# Patient Record
Sex: FEMALE | Employment: FULL TIME | URBAN - METROPOLITAN AREA
[De-identification: names, ages, dates, MRNs, and addresses within clinical notes are randomized per-mention and may not be internally consistent; named-entity substitution may affect disease eponyms.]

---

## 2019-06-13 ENCOUNTER — OFFICE VISIT (OUTPATIENT)
Dept: OTOLARYNGOLOGY | Facility: CLINIC | Age: 69
End: 2019-06-13
Payer: COMMERCIAL

## 2019-06-13 ENCOUNTER — OFFICE VISIT (OUTPATIENT)
Dept: AUDIOLOGY | Facility: CLINIC | Age: 69
End: 2019-06-13
Payer: COMMERCIAL

## 2019-06-13 VITALS
HEIGHT: 60 IN | SYSTOLIC BLOOD PRESSURE: 115 MMHG | DIASTOLIC BLOOD PRESSURE: 78 MMHG | WEIGHT: 98 LBS | OXYGEN SATURATION: 100 % | BODY MASS INDEX: 19.24 KG/M2 | HEART RATE: 65 BPM

## 2019-06-13 DIAGNOSIS — H90.8 MIXED HEARING LOSS, UNILATERAL: ICD-10-CM

## 2019-06-13 DIAGNOSIS — H90.A31 MIXED CONDUCTIVE AND SENSORINEURAL HEARING LOSS OF RIGHT EAR WITH RESTRICTED HEARING OF LEFT EAR: Primary | ICD-10-CM

## 2019-06-13 DIAGNOSIS — H93.11 TINNITUS, RIGHT: Primary | ICD-10-CM

## 2019-06-13 DIAGNOSIS — H93.11 RIGHT-SIDED TINNITUS: ICD-10-CM

## 2019-06-13 DIAGNOSIS — H72.91 PERFORATION OF TYMPANIC MEMBRANE, RIGHT: ICD-10-CM

## 2019-06-13 DIAGNOSIS — H90.A22 SENSORINEURAL HEARING LOSS (SNHL) OF LEFT EAR WITH RESTRICTED HEARING OF RIGHT EAR: ICD-10-CM

## 2019-06-13 PROCEDURE — 92557 COMPREHENSIVE HEARING TEST: CPT | Performed by: AUDIOLOGIST

## 2019-06-13 PROCEDURE — 99204 OFFICE O/P NEW MOD 45 MIN: CPT | Performed by: OTOLARYNGOLOGY

## 2019-06-13 PROCEDURE — 99207 ZZC NO CHARGE LOS: CPT | Performed by: AUDIOLOGIST

## 2019-06-13 PROCEDURE — 92550 TYMPANOMETRY & REFLEX THRESH: CPT | Performed by: AUDIOLOGIST

## 2019-06-13 RX ORDER — CHLORAL HYDRATE 500 MG
CAPSULE ORAL
COMMUNITY
Start: 2019-03-29

## 2019-06-13 RX ORDER — CHOLECALCIFEROL (VITAMIN D3) 25 MCG
CAPSULE ORAL
COMMUNITY
Start: 2019-03-29

## 2019-06-13 SDOH — HEALTH STABILITY: MENTAL HEALTH: HOW OFTEN DO YOU HAVE A DRINK CONTAINING ALCOHOL?: NEVER

## 2019-06-13 ASSESSMENT — MIFFLIN-ST. JEOR: SCORE: 901.03

## 2019-06-13 NOTE — PROGRESS NOTES
AUDIOLOGY REPORT:    Patient was referred to Audiology from ENT by Dr. Camejo for a hearing examination. Patient reports tinnitus of the right ear for many years. Patient reports a history of middle ear issues including PE tube placement in the right ear. Patient was unaccompanied to today's visit.     Testing:    Otoscopy:   Otoscopic exam indicates ears are clear of cerumen bilaterally     Tympanograms:    RIGHT: large ear canal volume consistent with tympanic membrane perforation     LEFT:   restricted eardrum mobility (Type As)     Reflexes (reported by stimulus ear):  RIGHT: Ipsilateral is absent at frequencies tested  RIGHT: Contralateral is absent at frequencies tested  LEFT:   Ipsilateral is absent at frequencies tested  LEFT:   Contralateral is absent at frequencies tested    Thresholds:   Pure Tone Thresholds assessed using conventional audiometry with good  reliability from 250-8000 Hz bilaterally using insert earphones and TDH headphones     RIGHT:  moderate-severe mixed hearing loss    LEFT:    mild sensorineural hearing loss    Speech Reception Threshold:    RIGHT: 45 dB HL    LEFT:   25 dB HL    Word Recognition Score:     RIGHT: 96% at 85 dB HL using NU-6 recorded word list.    LEFT:   88% at 65 dB HL using NU-6 recorded word list.    Discussed results with the patient.     Patient was returned to ENT for follow up.     Joe Mehta CCC-A  Licensed Audiologist  6/13/2019

## 2019-06-13 NOTE — PATIENT INSTRUCTIONS
General Scheduling Information  To schedule your CT/MRI scan, please contact [place] [facility] at [phone]   [address] [place] [address]    To schedule your Surgery, please contact our Specialty Schedulers at [phone]    ENT Clinic Locations Clinic Hours Telephone Number     [facility] Li  [address] [place] [address]   Tuesday:       8:00am -- 4:00pm    Wednesday:  8:00am - 4:00pm   To schedule an appointment with   Dr. Camejo,   please contact our   Specialty Scheduling Department at:     [phone]       [facility] Reynoso  [address] [place] [address]   Friday:          8:00am - 4:00pm         Urgent Care Locations Clinic Hours Telephone Numbers     [facility]day Brian  [address] [place] [address]     Monday-Friday:     11:00pm - 9:00pm    Saturday-Sunday:  9:00am - 5:00pm   [phone]     Uday Reynoso  [address] [place] [address]     Monday-Friday:      5:00pm - 9:00pm     Saturday-Sunday:  9:00am - 5:00pm   [phone]

## 2019-06-13 NOTE — LETTER
6/13/2019         RE: Magda Michael  [id number] [address] [facility] [address]        Dear Colleague,    Thank you for referring your patient, Magda Michael, [facility]. Please see a copy of my visit note below.    Chief Complaint - Tinnitus    History of Present Illness - Magda Michael is a 68 year old female who presents to me today with ringing in the right ear.  It has been present and noticeable for approximately years. It is stable, not worsening. Nothing helps it. She notes some right hearing loss too. She has had 2-3 right-sided ear tubes, last one was 4-5 years ago. This was due to fluid in ear, done elsewhere. No family history of hearing loss. She denies ear pain, drainage, vertigo. Hasn't tried anything for the ringing in right ear or hearing loss. Overall she feels she gets along okay with her hearing.    Past Medical History - healthy    Allergies - NKDA    Social History -   Social History     Socioeconomic History     Marital status:      Spouse name: Not on file     Number of children: Not on file     Years of education: Not on file     Highest education level: Not on file   Occupational History     Not on file   Social Needs     Financial resource strain: Not on file     Food insecurity:     Worry: Not on file     Inability: Not on file     Transportation needs:     Medical: Not on file     Non-medical: Not on file   Tobacco Use     Smoking status: Not on file   Substance and Sexual Activity     Alcohol use: Not on file     Drug use: Not on file     Sexual activity: Not on file   Lifestyle     Physical activity:     Days per week: Not on file     Minutes per session: Not on file     Stress: Not on file   Relationships     Social connections:     Talks on phone: Not on file     Gets together: Not on file     Attends Gnosticist service: Not on file     Active member of club or organization: Not on file     Attends meetings of clubs or organizations: Not on file     Relationship status:  Not on file     Intimate partner violence:     Fear of current or ex partner: Not on file     Emotionally abused: Not on file     Physically abused: Not on file     Forced sexual activity: Not on file   Other Topics Concern     Not on file   Social History Narrative     Not on file   nonsmoker    Family History - see HPI    Review of Systems - As per HPI and PMHx, otherwise 10+ comprehensive system review is negative.    Physical Exam  /78   Pulse 65   Ht 1.524 m (5')   Wt 44.5 kg (98 lb)   SpO2 100%   BMI 19.14 kg/m     General - The patient is in no distress. Alert and oriented to person and place, answers questions and cooperates with examination appropriately.   Neurologic - CN II-XII are grossly intact. No focal neurologic deficits.   Voice and Breathing - The patient was breathing comfortably without the use of accessory muscles. There was no wheezing, stridor, or stertor.  The patients voice was clear and strong.  Ears - clean ear canals. Right tympanic membrane has a 4 mm anterior perforation. Clean edges, no granulation or cholesteatoma. Middle ear is dry. Left tympanic membrane intact, no effusion or infection.   Eyes - Extraocular movements intact, and the pupils were reactive to light.  Sclera were not icteric or injected, conjunctiva were pink and moist.  Neck - Palpation of the occipital, submental, submandibular, internal jugular chain, and supraclavicular nodes did not demonstrate any abnormal lymph nodes or masses. No parotid masses. Palpation of the thyroid was soft and smooth, with no nodules or goiter appreciated.  The trachea was mobile and midline.  Cardiovascular - carotid pulses are 2+ bilaterally, regular rhythm        Audiologic Studies - An audiogram and tympanogram were performed today as part of the evaluation and personally reviewed. The tympanogram shows As curve left, high volume type B curve right.  The audiogram showed a significant conductive hearing loss right, and a  low to mid flat sensorineural hearing loss that then down-slopes in the high frequency range.       Assessment and Plan - Magda Michael is a 68 year old female who presents to me today with right-sided tinnitus due to hearing loss. Has a right mixed hearing loss with tympanic membrane perforation. Has a h/o ear tubes for OME on the right and this likely caused the perforation. We discussed tympanoplasty, the risks of surgery, benefits, and alternatives. We discussed that the hearing and/or tinnitus may or may not improve after surgery, although I explained that it will likely improve, but not be 100% better. She can also consider a hearing aid. Repeat audio in 1 year, sooner with changes.       Anjel Camejo MD  Otolaryngology  [facility]        Again, thank you for allowing me to participate in the care of your patient.        Sincerely,        Anjel Camejo MD

## 2019-06-13 NOTE — PROGRESS NOTES
Chief Complaint - Tinnitus    History of Present Illness - Magda Michael is a 68 year old female who presents to me today with ringing in the right ear.  It has been present and noticeable for approximately years. It is stable, not worsening. Nothing helps it. She notes some right hearing loss too. She has had 2-3 right-sided ear tubes, last one was 4-5 years ago. This was due to fluid in ear, done elsewhere. No family history of hearing loss. She denies ear pain, drainage, vertigo. Hasn't tried anything for the ringing in right ear or hearing loss. Overall she feels she gets along okay with her hearing.    Past Medical History - healthy    Allergies - NKDA    Social History -   Social History     Socioeconomic History     Marital status:      Spouse name: Not on file     Number of children: Not on file     Years of education: Not on file     Highest education level: Not on file   Occupational History     Not on file   Social Needs     Financial resource strain: Not on file     Food insecurity:     Worry: Not on file     Inability: Not on file     Transportation needs:     Medical: Not on file     Non-medical: Not on file   Tobacco Use     Smoking status: Not on file   Substance and Sexual Activity     Alcohol use: Not on file     Drug use: Not on file     Sexual activity: Not on file   Lifestyle     Physical activity:     Days per week: Not on file     Minutes per session: Not on file     Stress: Not on file   Relationships     Social connections:     Talks on phone: Not on file     Gets together: Not on file     Attends Bahai service: Not on file     Active member of club or organization: Not on file     Attends meetings of clubs or organizations: Not on file     Relationship status: Not on file     Intimate partner violence:     Fear of current or ex partner: Not on file     Emotionally abused: Not on file     Physically abused: Not on file     Forced sexual activity: Not on file   Other Topics Concern      Not on file   Social History Narrative     Not on file   nonsmoker    Family History - see HPI    Review of Systems - As per HPI and PMHx, otherwise 10+ comprehensive system review is negative.    Physical Exam  /78   Pulse 65   Ht 1.524 m (5')   Wt 44.5 kg (98 lb)   SpO2 100%   BMI 19.14 kg/m    General - The patient is in no distress. Alert and oriented to person and place, answers questions and cooperates with examination appropriately.   Neurologic - CN II-XII are grossly intact. No focal neurologic deficits.   Voice and Breathing - The patient was breathing comfortably without the use of accessory muscles. There was no wheezing, stridor, or stertor.  The patients voice was clear and strong.  Ears - clean ear canals. Right tympanic membrane has a 4 mm anterior perforation. Clean edges, no granulation or cholesteatoma. Middle ear is dry. Left tympanic membrane intact, no effusion or infection.   Eyes - Extraocular movements intact, and the pupils were reactive to light.  Sclera were not icteric or injected, conjunctiva were pink and moist.  Neck - Palpation of the occipital, submental, submandibular, internal jugular chain, and supraclavicular nodes did not demonstrate any abnormal lymph nodes or masses. No parotid masses. Palpation of the thyroid was soft and smooth, with no nodules or goiter appreciated.  The trachea was mobile and midline.  Cardiovascular - carotid pulses are 2+ bilaterally, regular rhythm        Audiologic Studies - An audiogram and tympanogram were performed today as part of the evaluation and personally reviewed. The tympanogram shows As curve left, high volume type B curve right.  The audiogram showed a significant conductive hearing loss right, and a low to mid flat sensorineural hearing loss that then down-slopes in the high frequency range.       Assessment and Plan - Magda Michael is a 68 year old female who presents to me today with right-sided tinnitus due to hearing loss. Has  a right mixed hearing loss with tympanic membrane perforation. Has a h/o ear tubes for OME on the right and this likely caused the perforation. We discussed tympanoplasty, the risks of surgery, benefits, and alternatives. We discussed that the hearing and/or tinnitus may or may not improve after surgery, although I explained that it will likely improve, but not be 100% better. She can also consider a hearing aid. Repeat audio in 1 year, sooner with changes.       Anjel Camejo MD  Otolaryngology  [facility]

## 2020-11-18 ENCOUNTER — OFFICE VISIT (OUTPATIENT)
Dept: AUDIOLOGY | Facility: CLINIC | Age: 70
End: 2020-11-18
Payer: COMMERCIAL

## 2020-11-18 ENCOUNTER — OFFICE VISIT (OUTPATIENT)
Dept: OTOLARYNGOLOGY | Facility: CLINIC | Age: 70
End: 2020-11-18
Payer: COMMERCIAL

## 2020-11-18 DIAGNOSIS — H90.A22 SENSORINEURAL HEARING LOSS (SNHL) OF LEFT EAR WITH RESTRICTED HEARING OF RIGHT EAR: ICD-10-CM

## 2020-11-18 DIAGNOSIS — H90.3 SENSORINEURAL HEARING LOSS (SNHL) OF BOTH EARS: ICD-10-CM

## 2020-11-18 DIAGNOSIS — H90.8 MIXED HEARING LOSS, UNILATERAL: Primary | ICD-10-CM

## 2020-11-18 DIAGNOSIS — H69.91 DISORDER OF RIGHT EUSTACHIAN TUBE: ICD-10-CM

## 2020-11-18 DIAGNOSIS — H90.A31 MIXED CONDUCTIVE AND SENSORINEURAL HEARING LOSS OF RIGHT EAR WITH RESTRICTED HEARING OF LEFT EAR: Primary | ICD-10-CM

## 2020-11-18 DIAGNOSIS — H72.91 PERFORATION OF TYMPANIC MEMBRANE, RIGHT: ICD-10-CM

## 2020-11-18 PROCEDURE — 92557 COMPREHENSIVE HEARING TEST: CPT | Performed by: AUDIOLOGIST

## 2020-11-18 PROCEDURE — 99214 OFFICE O/P EST MOD 30 MIN: CPT | Performed by: OTOLARYNGOLOGY

## 2020-11-18 PROCEDURE — 92550 TYMPANOMETRY & REFLEX THRESH: CPT | Performed by: AUDIOLOGIST

## 2020-11-18 PROCEDURE — 99207 PR NO CHARGE LOS: CPT | Performed by: AUDIOLOGIST

## 2020-11-18 NOTE — PROGRESS NOTES
Chief Complaint - recheck right ear    History of Present Illness - Magda Michael is a 69 year old female who returns to me today with ringing and hearing loss in the right ear.  It has been present and noticeable for approximately years. She feels things are stable. I saw her 1.5 years ago and she had a right tympanic membrane perforation. She has had 2-3 right-sided ear tubes, last one was 4-5 years ago. This was due to fluid in ear, done elsewhere. No family history of hearing loss. She denies ear pain, drainage, vertigo. Hasn't tried anything for the ringing in right ear or hearing loss. Overall she feels she gets along okay with her hearing. She doesn't want to try hearing aids. She isn't interested in surgery.     Past Medical History - healthy    Allergies - NKDA    Social History -   Social History     Socioeconomic History     Marital status:      Spouse name: Not on file     Number of children: Not on file     Years of education: Not on file     Highest education level: Not on file   Occupational History     Not on file   Social Needs     Financial resource strain: Not on file     Food insecurity:     Worry: Not on file     Inability: Not on file     Transportation needs:     Medical: Not on file     Non-medical: Not on file   Tobacco Use     Smoking status: Not on file   Substance and Sexual Activity     Alcohol use: Not on file     Drug use: Not on file     Sexual activity: Not on file   Lifestyle     Physical activity:     Days per week: Not on file     Minutes per session: Not on file     Stress: Not on file   Relationships     Social connections:     Talks on phone: Not on file     Gets together: Not on file     Attends Adventist service: Not on file     Active member of club or organization: Not on file     Attends meetings of clubs or organizations: Not on file     Relationship status: Not on file     Intimate partner violence:     Fear of current or ex partner: Not on file     Emotionally  abused: Not on file     Physically abused: Not on file     Forced sexual activity: Not on file   Other Topics Concern     Not on file   Social History Narrative     Not on file   nonsmoker    Family History - see HPI    Review of Systems - As per HPI and PMHx, otherwise 7 system review of the head and neck is negative.    Physical Exam  General - The patient is in no distress. Alert and oriented to person and place, answers questions and cooperates with examination appropriately.   Neurologic - CN II-XII are grossly intact. No focal neurologic deficits.   Voice and Breathing - The patient was breathing comfortably without the use of accessory muscles. There was no wheezing, stridor, or stertor.  The patients voice was clear and strong.  Ears - clean ear canals. Right tympanic membrane has a 4 mm anterior perforation. Clean edges, no granulation or cholesteatoma. Middle ear is dry. Left tympanic membrane intact, no effusion or infection.   Eyes - Extraocular movements intact, and the pupils were reactive to light.  Sclera were not icteric or injected, conjunctiva were pink and moist.  Neck - soft, non-tender, palpation of the occipital, submental, submandibular, internal jugular chain, and supraclavicular nodes did not demonstrate any abnormal lymph nodes or masses. No parotid masses.       Audiologic Studies - An audiogram and tympanogram were performed today as part of the evaluation and personally reviewed. The tympanogram shows As curve left, high volume type B curve right.  The audiogram showed a significant conductive hearing loss right, and a low to mid flat sensorineural hearing loss that then down-slopes in the high frequency range. Hearing is stable compared to 6/2019      Assessment and Plan - Magda Michael is a 69 year old female who return to me today with right-sided tinnitus due to hearing loss. Has a right mixed hearing loss with tympanic membrane perforation. Has a h/o ear tubes for OME on the right and  this likely caused the perforation. We discussed tympanoplasty, the risks of surgery, benefits, and alternatives. We discussed that the hearing and/or tinnitus may or may not improve after surgery, although I explained that it will likely improve, but not be 100% better. She can also consider a hearing aid. She prefers to continue observation, no surgery or hearing aid. I encouraged a hearing aid, but she doesn't want that for now. Recheck hearing in 1-2 years, sooner if things worsen.       Anjel Camejo MD  Otolaryngology  [facility]

## 2020-11-18 NOTE — LETTER
11/18/2020         RE: Magda Michael  [address]  [place] [address]        Dear Colleague,    Thank you for referring your patient, Magda Michael, to the [facility]. Please see a copy of my visit note below.    Chief Complaint - recheck right ear    History of Present Illness - Magda Michael is a 69 year old female who returns to me today with ringing and hearing loss in the right ear.  It has been present and noticeable for approximately years. She feels things are stable. I saw her 1.5 years ago and she had a right tympanic membrane perforation. She has had 2-3 right-sided ear tubes, last one was 4-5 years ago. This was due to fluid in ear, done elsewhere. No family history of hearing loss. She denies ear pain, drainage, vertigo. Hasn't tried anything for the ringing in right ear or hearing loss. Overall she feels she gets along okay with her hearing. She doesn't want to try hearing aids. She isn't interested in surgery.     Past Medical History - healthy    Allergies - NKDA    Social History -   Social History     Socioeconomic History     Marital status:      Spouse name: Not on file     Number of children: Not on file     Years of education: Not on file     Highest education level: Not on file   Occupational History     Not on file   Social Needs     Financial resource strain: Not on file     Food insecurity:     Worry: Not on file     Inability: Not on file     Transportation needs:     Medical: Not on file     Non-medical: Not on file   Tobacco Use     Smoking status: Not on file   Substance and Sexual Activity     Alcohol use: Not on file     Drug use: Not on file     Sexual activity: Not on file   Lifestyle     Physical activity:     Days per week: Not on file     Minutes per session: Not on file     Stress: Not on file   Relationships     Social connections:     Talks on phone: Not on file     Gets together: Not on file     Attends Hinduism service: Not on file     Active  member of club or organization: Not on file     Attends meetings of clubs or organizations: Not on file     Relationship status: Not on file     Intimate partner violence:     Fear of current or ex partner: Not on file     Emotionally abused: Not on file     Physically abused: Not on file     Forced sexual activity: Not on file   Other Topics Concern     Not on file   Social History Narrative     Not on file   nonsmoker    Family History - see HPI    Review of Systems - As per HPI and PMHx, otherwise 7 system review of the head and neck is negative.    Physical Exam  General - The patient is in no distress. Alert and oriented to person and place, answers questions and cooperates with examination appropriately.   Neurologic - CN II-XII are grossly intact. No focal neurologic deficits.   Voice and Breathing - The patient was breathing comfortably without the use of accessory muscles. There was no wheezing, stridor, or stertor.  The patients voice was clear and strong.  Ears - clean ear canals. Right tympanic membrane has a 4 mm anterior perforation. Clean edges, no granulation or cholesteatoma. Middle ear is dry. Left tympanic membrane intact, no effusion or infection.   Eyes - Extraocular movements intact, and the pupils were reactive to light.  Sclera were not icteric or injected, conjunctiva were pink and moist.  Neck - soft, non-tender, palpation of the occipital, submental, submandibular, internal jugular chain, and supraclavicular nodes did not demonstrate any abnormal lymph nodes or masses. No parotid masses.       Audiologic Studies - An audiogram and tympanogram were performed today as part of the evaluation and personally reviewed. The tympanogram shows As curve left, high volume type B curve right.  The audiogram showed a significant conductive hearing loss right, and a low to mid flat sensorineural hearing loss that then down-slopes in the high frequency range. Hearing is stable compared to  6/2019      Assessment and Plan - Magda Michael is a 69 year old female who return to me today with right-sided tinnitus due to hearing loss. Has a right mixed hearing loss with tympanic membrane perforation. Has a h/o ear tubes for OME on the right and this likely caused the perforation. We discussed tympanoplasty, the risks of surgery, benefits, and alternatives. We discussed that the hearing and/or tinnitus may or may not improve after surgery, although I explained that it will likely improve, but not be 100% better. She can also consider a hearing aid. She prefers to continue observation, no surgery or hearing aid. I encouraged a hearing aid, but she doesn't want that for now. Recheck hearing in 1-2 years, sooner if things worsen.       Anjel Camejo MD  Otolaryngology  [facility]          Again, thank you for allowing me to participate in the care of your patient.        Sincerely,        Anjel Camejo MD

## 2020-11-18 NOTE — PROGRESS NOTES
AUDIOLOGY REPORT:    Patient was referred to [facility] from ENT by Dr. Camejo for a hearing examination. Patient reports possible decline in hearing. Patient was unaccompanied to today's visit.     Testing:    Otoscopy:   Otoscopic exam indicates ears are clear of cerumen bilaterally     Tympanograms:    RIGHT: large ear canal volume consistent with tympanic membrane perforation     LEFT:   restricted eardrum mobility (Type As)    Reflexes (reported by stimulus ear): 1000 Hz  RIGHT: Ipsilateral is absent at frequencies tested  RIGHT: Contralateral is present at normal levels  LEFT:   Ipsilateral is absent at frequencies tested  LEFT:   Contralateral is absent at frequencies tested    Thresholds:   Pure Tone Thresholds assessed using conventional audiometry with good  reliability from 250-8000 Hz bilaterally using insert earphones and circumaural headphones     RIGHT:  mild and severe mixed hearing loss    LEFT:    mild and moderate-severe sensorineural hearing loss    Speech Reception Threshold:    RIGHT: 65 dB HL    LEFT:   30 dB HL    Word Recognition Score:     RIGHT: 92% at 95 dB HL using NU-6 recorded word list.    LEFT:   96% at 60 dB HL using NU-6 recorded word list.    Discussed results with the patient.     Patient was returned to ENT for follow up.     Joe Mehta CCC-A  Licensed Audiologist  11/18/2020